# Patient Record
Sex: MALE | Race: WHITE | NOT HISPANIC OR LATINO | URBAN - METROPOLITAN AREA
[De-identification: names, ages, dates, MRNs, and addresses within clinical notes are randomized per-mention and may not be internally consistent; named-entity substitution may affect disease eponyms.]

---

## 2021-02-14 ENCOUNTER — HOSPITAL ENCOUNTER (EMERGENCY)
Facility: MEDICAL CENTER | Age: 36
End: 2021-02-14
Attending: EMERGENCY MEDICINE

## 2021-02-14 VITALS
BODY MASS INDEX: 30.87 KG/M2 | TEMPERATURE: 98.3 F | DIASTOLIC BLOOD PRESSURE: 80 MMHG | WEIGHT: 203.71 LBS | SYSTOLIC BLOOD PRESSURE: 116 MMHG | RESPIRATION RATE: 16 BRPM | OXYGEN SATURATION: 98 % | HEART RATE: 83 BPM | HEIGHT: 68 IN

## 2021-02-14 DIAGNOSIS — K29.21 ACUTE ALCOHOLIC GASTRITIS WITH HEMORRHAGE: ICD-10-CM

## 2021-02-14 DIAGNOSIS — R10.10 UPPER ABDOMINAL PAIN: ICD-10-CM

## 2021-02-14 LAB
ALBUMIN SERPL BCP-MCNC: 4.1 G/DL (ref 3.2–4.9)
ALBUMIN/GLOB SERPL: 1.2 G/DL
ALP SERPL-CCNC: 38 U/L (ref 30–99)
ALT SERPL-CCNC: 24 U/L (ref 2–50)
ANION GAP SERPL CALC-SCNC: 15 MMOL/L (ref 7–16)
AST SERPL-CCNC: 25 U/L (ref 12–45)
BASOPHILS # BLD AUTO: 0.5 % (ref 0–1.8)
BASOPHILS # BLD: 0.04 K/UL (ref 0–0.12)
BILIRUB SERPL-MCNC: 0.2 MG/DL (ref 0.1–1.5)
BUN SERPL-MCNC: 6 MG/DL (ref 8–22)
CALCIUM SERPL-MCNC: 9.3 MG/DL (ref 8.5–10.5)
CHLORIDE SERPL-SCNC: 94 MMOL/L (ref 96–112)
CO2 SERPL-SCNC: 22 MMOL/L (ref 20–33)
CREAT SERPL-MCNC: 0.71 MG/DL (ref 0.5–1.4)
EOSINOPHIL # BLD AUTO: 0.13 K/UL (ref 0–0.51)
EOSINOPHIL NFR BLD: 1.5 % (ref 0–6.9)
ERYTHROCYTE [DISTWIDTH] IN BLOOD BY AUTOMATED COUNT: 46.3 FL (ref 35.9–50)
GLOBULIN SER CALC-MCNC: 3.3 G/DL (ref 1.9–3.5)
GLUCOSE SERPL-MCNC: 100 MG/DL (ref 65–99)
HCT VFR BLD AUTO: 43 % (ref 42–52)
HGB BLD-MCNC: 14.3 G/DL (ref 14–18)
IMM GRANULOCYTES # BLD AUTO: 0.09 K/UL (ref 0–0.11)
IMM GRANULOCYTES NFR BLD AUTO: 1.1 % (ref 0–0.9)
LIPASE SERPL-CCNC: 28 U/L (ref 11–82)
LYMPHOCYTES # BLD AUTO: 3.52 K/UL (ref 1–4.8)
LYMPHOCYTES NFR BLD: 41.4 % (ref 22–41)
MCH RBC QN AUTO: 32.6 PG (ref 27–33)
MCHC RBC AUTO-ENTMCNC: 33.3 G/DL (ref 33.7–35.3)
MCV RBC AUTO: 97.9 FL (ref 81.4–97.8)
MONOCYTES # BLD AUTO: 1.11 K/UL (ref 0–0.85)
MONOCYTES NFR BLD AUTO: 13.1 % (ref 0–13.4)
NEUTROPHILS # BLD AUTO: 3.61 K/UL (ref 1.82–7.42)
NEUTROPHILS NFR BLD: 42.4 % (ref 44–72)
NRBC # BLD AUTO: 0 K/UL
NRBC BLD-RTO: 0 /100 WBC
PLATELET # BLD AUTO: 321 K/UL (ref 164–446)
PMV BLD AUTO: 9.1 FL (ref 9–12.9)
POTASSIUM SERPL-SCNC: 3.3 MMOL/L (ref 3.6–5.5)
PROT SERPL-MCNC: 7.4 G/DL (ref 6–8.2)
RBC # BLD AUTO: 4.39 M/UL (ref 4.7–6.1)
SODIUM SERPL-SCNC: 131 MMOL/L (ref 135–145)
WBC # BLD AUTO: 8.5 K/UL (ref 4.8–10.8)

## 2021-02-14 PROCEDURE — 99284 EMERGENCY DEPT VISIT MOD MDM: CPT | Mod: EDC

## 2021-02-14 PROCEDURE — 83690 ASSAY OF LIPASE: CPT

## 2021-02-14 PROCEDURE — A9270 NON-COVERED ITEM OR SERVICE: HCPCS | Performed by: EMERGENCY MEDICINE

## 2021-02-14 PROCEDURE — 700102 HCHG RX REV CODE 250 W/ 637 OVERRIDE(OP): Performed by: EMERGENCY MEDICINE

## 2021-02-14 PROCEDURE — 85025 COMPLETE CBC W/AUTO DIFF WBC: CPT

## 2021-02-14 PROCEDURE — 36415 COLL VENOUS BLD VENIPUNCTURE: CPT | Mod: EDC

## 2021-02-14 PROCEDURE — 700111 HCHG RX REV CODE 636 W/ 250 OVERRIDE (IP): Performed by: EMERGENCY MEDICINE

## 2021-02-14 PROCEDURE — 80053 COMPREHEN METABOLIC PANEL: CPT

## 2021-02-14 RX ORDER — OMEPRAZOLE 40 MG/1
40 CAPSULE, DELAYED RELEASE ORAL DAILY
Qty: 30 CAPSULE | Refills: 0 | Status: SHIPPED | OUTPATIENT
Start: 2021-02-14

## 2021-02-14 RX ORDER — SUCRALFATE 1 G/1
1 TABLET ORAL
Qty: 120 TABLET | Refills: 3 | Status: SHIPPED | OUTPATIENT
Start: 2021-02-14

## 2021-02-14 RX ORDER — ONDANSETRON 4 MG/1
4 TABLET, ORALLY DISINTEGRATING ORAL ONCE
Status: COMPLETED | OUTPATIENT
Start: 2021-02-14 | End: 2021-02-14

## 2021-02-14 RX ADMIN — LIDOCAINE HYDROCHLORIDE 30 ML: 20 SOLUTION OROPHARYNGEAL at 17:43

## 2021-02-14 RX ADMIN — ONDANSETRON 4 MG: 4 TABLET, ORALLY DISINTEGRATING ORAL at 17:43

## 2021-02-14 ASSESSMENT — LIFESTYLE VARIABLES
TOTAL SCORE: 3
HOW MANY TIMES IN THE PAST YEAR HAVE YOU HAD 5 OR MORE DRINKS IN A DAY: 365
ON A TYPICAL DAY WHEN YOU DRINK ALCOHOL HOW MANY DRINKS DO YOU HAVE: 12
EVER HAD A DRINK FIRST THING IN THE MORNING TO STEADY YOUR NERVES TO GET RID OF A HANGOVER: YES
HAVE YOU EVER FELT YOU SHOULD CUT DOWN ON YOUR DRINKING: YES
DOES PATIENT WANT TO STOP DRINKING: NO
HAVE PEOPLE ANNOYED YOU BY CRITICIZING YOUR DRINKING: NO
DO YOU DRINK ALCOHOL: YES
AVERAGE NUMBER OF DAYS PER WEEK YOU HAVE A DRINK CONTAINING ALCOHOL: 7
CONSUMPTION TOTAL: POSITIVE
EVER FELT BAD OR GUILTY ABOUT YOUR DRINKING: YES

## 2021-02-15 NOTE — ED TRIAGE NOTES
Oliverio Moralez  36 y.o.  Chief Complaint   Patient presents with   • Epigastric Pain     since yesterday   • N/V     reports vomiting mucus, bile, and trace blood today; denies diarrhea or urinary sx

## 2021-02-15 NOTE — ED NOTES
Pt reports he is a heavy drinker. Reports all symptoms started today. Pt reports blood work has been sent to lab.

## 2021-02-15 NOTE — ED NOTES
"Oliverio Moralez D/C'd.  Discharge instructions including s/s to return to ED, follow up appointments, hydration importance and stopping alcohol consumption education  provided to pt.    Pt verbalized understanding with no further questions and concerns.    Copy of discharge provided to pt.  Signed copy in chart.    Pt ambulates out of department; pt awake, alert and oriented.   VS /80   Pulse 83   Temp 36.8 °C (98.3 °F) (Temporal)   Resp 16   Ht 1.727 m (5' 8\")   Wt 92.4 kg (203 lb 11.3 oz)   SpO2 98%   BMI 30.97 kg/m²       "

## 2021-02-15 NOTE — ED PROVIDER NOTES
"ED Provider Note    CHIEF COMPLAINT  Chief Complaint   Patient presents with    Epigastric Pain     since yesterday    N/V     reports vomiting mucus, bile, and trace blood today; denies diarrhea or urinary sx       HPI  Oliverio Moralez is a 36 y.o. male who presents 2 days of upper abdominal pain with nausea and vomiting and some blood in his emesis, states that he is a heavy alcohol drinker although has never had these symptoms before.  He tried some Tums with minimal improvement.  The patient states that approximately 15% of his emesis is actual blood, the rest of his bile or food product.  No abdominal surgeries.  No back pain or chest pain or shortness of breath.  He is a smoker.  No other specific complaints.    REVIEW OF SYSTEMS  Negative for fever, rash, chest pain, dyspnea, headache, focal weakness, focal numbness, focal tingling, back pain. All other systems are negative.     PAST MEDICAL HISTORY  History reviewed. No pertinent past medical history.    FAMILY HISTORY  History reviewed. No pertinent family history.    SOCIAL HISTORY  Social History     Tobacco Use    Smoking status: Current Every Day Smoker    Smokeless tobacco: Never Used   Substance Use Topics    Alcohol use: Yes     Comment: occasional    Drug use: Yes     Types: Inhaled     Comment: marijuana       SURGICAL HISTORY  Past Surgical History:   Procedure Laterality Date    HERNIA REPAIR         CURRENT MEDICATIONS  I personally reviewed the medication list in the charting documentation.     ALLERGIES  No Known Allergies    MEDICAL RECORD  I have reviewed patient's medical record and pertinent results are listed above.      PHYSICAL EXAM  VITAL SIGNS: /85   Pulse 83   Temp 36.7 °C (98.1 °F) (Temporal)   Resp 16   Ht 1.727 m (5' 8\")   Wt 92.4 kg (203 lb 11.3 oz)   SpO2 98%   BMI 30.97 kg/m²    Constitutional: Well appearing patient in no acute distress.  Not toxic, nor ill in appearance.  HENT: Normocephalic, no evidence " of acute trauma.  Eyes: No scleral icterus. Normal conjunctiva   Neck: Supple, comfortable, nonpainful range of motion.   Cardiovascular: Regular heart rate and rhythm.   Thorax & Lungs: Chest is nontender.  Lungs are clear to auscultation with good air movement bilaterally.  No wheeze, rhonchi, nor rales.   Abdomen: Soft, nondistended, mild tenderness across the upper abdomen.  No rebound or guarding.   Skin: Warm, dry. No rash appreciated  Neurologic: Alert & oriented. No focal deficits observed.   Psychiatric: Normal affect appropriate for the clinical situation.    DIAGNOSTIC STUDIES / PROCEDURES    Results for orders placed or performed during the hospital encounter of 02/14/21   CBC WITH DIFFERENTIAL   Result Value Ref Range    WBC 8.5 4.8 - 10.8 K/uL    RBC 4.39 (L) 4.70 - 6.10 M/uL    Hemoglobin 14.3 14.0 - 18.0 g/dL    Hematocrit 43.0 42.0 - 52.0 %    MCV 97.9 (H) 81.4 - 97.8 fL    MCH 32.6 27.0 - 33.0 pg    MCHC 33.3 (L) 33.7 - 35.3 g/dL    RDW 46.3 35.9 - 50.0 fL    Platelet Count 321 164 - 446 K/uL    MPV 9.1 9.0 - 12.9 fL    Neutrophils-Polys 42.40 (L) 44.00 - 72.00 %    Lymphocytes 41.40 (H) 22.00 - 41.00 %    Monocytes 13.10 0.00 - 13.40 %    Eosinophils 1.50 0.00 - 6.90 %    Basophils 0.50 0.00 - 1.80 %    Immature Granulocytes 1.10 (H) 0.00 - 0.90 %    Nucleated RBC 0.00 /100 WBC    Neutrophils (Absolute) 3.61 1.82 - 7.42 K/uL    Lymphs (Absolute) 3.52 1.00 - 4.80 K/uL    Monos (Absolute) 1.11 (H) 0.00 - 0.85 K/uL    Eos (Absolute) 0.13 0.00 - 0.51 K/uL    Baso (Absolute) 0.04 0.00 - 0.12 K/uL    Immature Granulocytes (abs) 0.09 0.00 - 0.11 K/uL    NRBC (Absolute) 0.00 K/uL   COMP METABOLIC PANEL   Result Value Ref Range    Sodium 131 (L) 135 - 145 mmol/L    Potassium 3.3 (L) 3.6 - 5.5 mmol/L    Chloride 94 (L) 96 - 112 mmol/L    Co2 22 20 - 33 mmol/L    Anion Gap 15.0 7.0 - 16.0    Glucose 100 (H) 65 - 99 mg/dL    Bun 6 (L) 8 - 22 mg/dL    Creatinine 0.71 0.50 - 1.40 mg/dL    Calcium 9.3 8.5 - 10.5  mg/dL    AST(SGOT) 25 12 - 45 U/L    ALT(SGPT) 24 2 - 50 U/L    Alkaline Phosphatase 38 30 - 99 U/L    Total Bilirubin 0.2 0.1 - 1.5 mg/dL    Albumin 4.1 3.2 - 4.9 g/dL    Total Protein 7.4 6.0 - 8.2 g/dL    Globulin 3.3 1.9 - 3.5 g/dL    A-G Ratio 1.2 g/dL   LIPASE   Result Value Ref Range    Lipase 28 11 - 82 U/L   ESTIMATED GFR   Result Value Ref Range    GFR If African American >60 >60 mL/min/1.73 m 2    GFR If Non African American >60 >60 mL/min/1.73 m 2        COURSE & MEDICAL DECISION MAKING  I have reviewed any medical record information, laboratory studies and radiographic results as noted above.  Differential diagnoses includes: Alcoholic gastritis/PUD, pancreatitis, hepatitis, biliary obstruction, anemia, dehydration, electrolyte abnormalities    Encounter Summary: This is a 36 y.o. male with 2 days of vomiting and epigastric abdominal pain in the setting of heavy alcohol use, he has had some hematemesis but states this seems to make up the minority of his vomitus.  He has tenderness across the upper abdomen on exam but no evidence of peritonitis.  His vital signs are within normal limits, no indication of tachycardia or hypotension.  Will obtain blood work including a lipase, medicate with a GI cocktail and he will be reevaluated ------- blood work is reassuring.  At this point, I think gastritis/PUD is the most likely etiology, will prescribe him Prilosec and Carafate although of most importance I have educated him on the importance of alcohol cessation and relieving his symptoms, he states that he typically drinks around 12 alcoholic beverages a day and has been doing so for quite some time he has been referred to GI.  Strict return instructions have been discussed.      DISPOSITION: Discharged home in stable condition      FINAL IMPRESSION  1. Acute alcoholic gastritis with hemorrhage    2. Upper abdominal pain           This dictation was created using voice recognition software. The accuracy of the  dictation is limited to the abilities of the software. I expect there may be some errors of grammar and possibly content. The nursing notes were reviewed and certain aspects of this information were incorporated into this note.    Electronically signed by: Mj Biggs M.D., 2/14/2021 5:37 PM

## 2025-04-05 ENCOUNTER — OFFICE VISIT (OUTPATIENT)
Dept: URGENT CARE | Facility: CLINIC | Age: 40
End: 2025-04-05
Payer: COMMERCIAL

## 2025-04-05 VITALS
HEART RATE: 82 BPM | OXYGEN SATURATION: 99 % | BODY MASS INDEX: 32.01 KG/M2 | HEIGHT: 68 IN | TEMPERATURE: 97.7 F | WEIGHT: 211.2 LBS | DIASTOLIC BLOOD PRESSURE: 56 MMHG | RESPIRATION RATE: 14 BRPM | SYSTOLIC BLOOD PRESSURE: 104 MMHG

## 2025-04-05 DIAGNOSIS — M54.50 NONSPECIFIC LOW BACK PAIN: ICD-10-CM

## 2025-04-05 DIAGNOSIS — Z75.8 DOES NOT HAVE PRIMARY CARE PROVIDER: ICD-10-CM

## 2025-04-05 PROCEDURE — 3074F SYST BP LT 130 MM HG: CPT

## 2025-04-05 PROCEDURE — 3078F DIAST BP <80 MM HG: CPT

## 2025-04-05 PROCEDURE — 99213 OFFICE O/P EST LOW 20 MIN: CPT

## 2025-04-05 RX ORDER — PREDNISONE 20 MG/1
20 TABLET ORAL DAILY
Qty: 3 TABLET | Refills: 0 | Status: SHIPPED | OUTPATIENT
Start: 2025-04-05 | End: 2025-04-08

## 2025-04-05 RX ORDER — CYCLOBENZAPRINE HCL 10 MG
TABLET ORAL
Qty: 21 TABLET | Refills: 0 | Status: SHIPPED | OUTPATIENT
Start: 2025-04-05

## 2025-04-05 NOTE — PROGRESS NOTES
"Subjective     Edelizabeth Moralez is a 40 y.o. male who presents with Low Back Pain (X couple weeks , getting worse )            HPI  This is a chronic problem, reports this has been an ongoing problem for the past 20 years. Recent flare began 2 weeks ago. Localized. Non-radiating.  Worse with movement including bending forward, twisting, sitting, standing and laying down for too long. Pt lifts heavy boxes at work. Denies blood in urine, dysuria, urinary frequncy and urgency.     No fevers, chills, trauma, recent surgery, new onset numbness, tingling, or weakness in lower extremities, groin or saddle paresthesia. Denies urinary, fecal incontinence or retention, IV drug use, history of cancer/malignancy, unexplained weight loss.     Review of Systems   All other systems reviewed and are negative.           Objective     /56 (BP Location: Left arm, Patient Position: Sitting, BP Cuff Size: Adult)   Pulse 82   Temp 36.5 °C (97.7 °F)   Resp 14   Ht 1.727 m (5' 8\")   Wt 95.8 kg (211 lb 3.2 oz)   SpO2 99%   BMI 32.11 kg/m²      Physical Exam  Vitals reviewed.   Constitutional:       Appearance: Normal appearance. He is not ill-appearing or toxic-appearing.   HENT:      Head: Normocephalic and atraumatic.      Right Ear: External ear normal.      Left Ear: External ear normal.   Cardiovascular:      Rate and Rhythm: Normal rate.   Pulmonary:      Effort: Pulmonary effort is normal.   Musculoskeletal:      Comments: No asymmetry or gross deformities appreciated. Skin w/o rash, erythema, ecchymosis. FROM to flexion, extension, lateral bend, lateral twist but with pain. No midline or bony tenderness. No CVA tenderness. + Paraspinal musculature tenderness. 5/5 strength to hip flexion/extension, knee flexion/extension, dorsiflexion, plantar flexion. Straight leg raise negative. Neuro: SILT, 2+ DTR bilateral LE.    Skin:     General: Skin is warm and dry.      Capillary Refill: Capillary refill takes less than 2 " seconds.   Neurological:      General: No focal deficit present.      Mental Status: He is alert.   Psychiatric:         Behavior: Behavior normal.                                  Assessment & Plan  Nonspecific low back pain    Orders:    cyclobenzaprine (FLEXERIL) 10 mg Tab; 1 tab by mouth every 8 hours only if needed for pain, muscle spasm, and/or muscle tightness. May cause drowsiness.    predniSONE (DELTASONE) 20 MG Tab; Take 1 Tablet by mouth every day for 3 days.    Does not have primary care provider    Orders:    Referral to establish with PCP    Responded well to steroids in the past, per patient.  Recommend rest but no bed rest, back strengthening exercises and stretching, alternating with warm/ice packs.     Discussed management options (risks, benefits, and alternatives to treatment). Reasonable side affects and potential adverse effects of medication discussed. Pt expresses understanding and the treatment plan was agreed upon.     Differential diagnosis, natural history, supportive care, and indications for immediate follow-up discussed. Advised the patient to follow-up with PCP or RTC for recheck, reevaluation, and consideration of further management. Instructed to go to the nearest Emergency Department or call 911 if symptoms fail to improve, for any change in condition, further concerns, or new concerning symptoms.     Electronically signed by   Chaparro Booker DNP, JM, FABIOLA

## 2025-05-14 ENCOUNTER — OFFICE VISIT (OUTPATIENT)
Dept: MEDICAL GROUP | Facility: LAB | Age: 40
End: 2025-05-14
Payer: COMMERCIAL

## 2025-05-14 VITALS
WEIGHT: 214.73 LBS | OXYGEN SATURATION: 98 % | RESPIRATION RATE: 16 BRPM | TEMPERATURE: 98.7 F | BODY MASS INDEX: 32.54 KG/M2 | HEIGHT: 68 IN | SYSTOLIC BLOOD PRESSURE: 132 MMHG | DIASTOLIC BLOOD PRESSURE: 84 MMHG | HEART RATE: 84 BPM

## 2025-05-14 DIAGNOSIS — Z23 NEED FOR VACCINATION: ICD-10-CM

## 2025-05-14 DIAGNOSIS — M54.50 LUMBAR PAIN: ICD-10-CM

## 2025-05-14 DIAGNOSIS — Z00.00 ENCOUNTER FOR ADULT WELLNESS VISIT: ICD-10-CM

## 2025-05-14 DIAGNOSIS — F17.210 CIGARETTE NICOTINE DEPENDENCE WITHOUT COMPLICATION: ICD-10-CM

## 2025-05-14 PROCEDURE — 90471 IMMUNIZATION ADMIN: CPT | Mod: 25 | Performed by: PHYSICIAN ASSISTANT

## 2025-05-14 PROCEDURE — 90715 TDAP VACCINE 7 YRS/> IM: CPT | Performed by: PHYSICIAN ASSISTANT

## 2025-05-14 PROCEDURE — 3075F SYST BP GE 130 - 139MM HG: CPT | Performed by: PHYSICIAN ASSISTANT

## 2025-05-14 PROCEDURE — 90746 HEPB VACCINE 3 DOSE ADULT IM: CPT | Mod: JZ | Performed by: PHYSICIAN ASSISTANT

## 2025-05-14 PROCEDURE — 99406 BEHAV CHNG SMOKING 3-10 MIN: CPT | Performed by: PHYSICIAN ASSISTANT

## 2025-05-14 PROCEDURE — 99214 OFFICE O/P EST MOD 30 MIN: CPT | Mod: 25 | Performed by: PHYSICIAN ASSISTANT

## 2025-05-14 PROCEDURE — 3079F DIAST BP 80-89 MM HG: CPT | Performed by: PHYSICIAN ASSISTANT

## 2025-05-14 PROCEDURE — 90472 IMMUNIZATION ADMIN EACH ADD: CPT | Mod: 25 | Performed by: PHYSICIAN ASSISTANT

## 2025-05-14 ASSESSMENT — PATIENT HEALTH QUESTIONNAIRE - PHQ9: CLINICAL INTERPRETATION OF PHQ2 SCORE: 0

## 2025-05-14 NOTE — ASSESSMENT & PLAN NOTE
Chronic,stable   Recent flareup of pain a month ago   Recommend moving forward with a lumbar x-ray for further evaluation as well as physical therapy pain relief.  In the interim I will supplement him with topical Voltaren gel for pain relief.    Orders:    DX-LUMBAR SPINE-2 OR 3 VIEWS; Future    Referral to Physical Therapy    diclofenac sodium (VOLTAREN) 1 % Gel; Apply 2 g topically 4 times a day as needed (Apply topically for pain reief).

## 2025-05-14 NOTE — PROGRESS NOTES
Subjective  Oliverio Moralez is a 40 y.o. male who presents today to establish care.     Chronic lumbar pain  Onset: Several years ago, gradually worsening  Patient was in the urgent care a month ago, after he got out of bed and could barely walk due to pain.  This is what motivated him to establish with a primary care provider.  He describes his symptoms as a dull and achy sensation across the low back, with radicular symptoms down the left lower extremity, and into the left heel.   Alleviating factors: Rest and Flexeril  Aggravating factors: Heavy lifting and being on his feet for long periods of time.   Denied loss of bowel or bladder control, denies saddle anesthesia.    Health maintenance  Dental care: Patient does not see dentist yearly, and has not seen one in several years.  He is currently not interested in establishing with a dentist  Diet and exercise: Patient eats an overall balanced diet, reports that his fiancée enjoys cooking at home, and they will often have ground turkey and vegetables.  It is rare when he will indulge in red meat, such as steak.  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      No family history on file.    Social History     Socioeconomic History    Marital status: Single     Spouse name: Not on file    Number of children: Not on file    Years of education: Not on file    Highest education level: Not on file   Occupational History    Not on file   Tobacco Use    Smoking status: Every Day     Current packs/day: 1.00     Types: Cigarettes    Smokeless tobacco: Never   Vaping Use    Vaping status: Never Used   Substance and Sexual Activity    Alcohol use: Yes     Comment: 6 pac of beer a day    Drug use: Yes     Types: Marijuana     Comment: marijuana    Sexual activity: Not on file   Other Topics Concern    Not on file   Social History Narrative    Not on file     Social Drivers of Health     Financial Resource Strain: Not on file   Food  "Insecurity: Not on file   Transportation Needs: Not on file   Physical Activity: Not on file   Stress: Not on file   Social Connections: Not on file   Intimate Partner Violence: Not on file   Housing Stability: Not on file       Past Surgical History[1]    Past Medical History[2]    Current Medications[3]    Allergies[4]    Objective  /84 (BP Location: Left arm, Patient Position: Sitting, BP Cuff Size: Adult)   Pulse 84   Temp 37.1 °C (98.7 °F) (Temporal)   Resp 16   Ht 1.727 m (5' 8\")   Wt 97.4 kg (214 lb 11.7 oz)   SpO2 98%   Physical Exam  Constitutional:       Appearance: Normal appearance.   HENT:      Right Ear: Tympanic membrane, ear canal and external ear normal. There is no impacted cerumen.      Left Ear: Tympanic membrane, ear canal and external ear normal. There is no impacted cerumen.      Mouth/Throat:      Mouth: Mucous membranes are moist.      Pharynx: Oropharynx is clear. No oropharyngeal exudate or posterior oropharyngeal erythema.   Eyes:      Extraocular Movements: Extraocular movements intact.      Pupils: Pupils are equal, round, and reactive to light.   Cardiovascular:      Rate and Rhythm: Normal rate and regular rhythm.   Pulmonary:      Effort: Pulmonary effort is normal.      Breath sounds: Normal breath sounds.   Musculoskeletal:      Right lower leg: No edema.      Left lower leg: No edema.      Comments: TTP over the lumbar facets, bilaterally   Skin:     General: Skin is warm and dry.   Neurological:      Mental Status: He is alert.   Psychiatric:         Mood and Affect: Mood normal.         Behavior: Behavior normal.         Thought Content: Thought content normal.         Labs: None    Imaging: None    Assessment & Plan  Encounter for adult wellness visit  A lipid panel, thyroid function tests, A1c, CBC, and CMP will be ordered. He was advised to fast for 8 to 10 hours prior to the blood draw.      Orders:    HEMOGLOBIN A1C; Future    CBC WITHOUT DIFFERENTIAL; Future    " Comp Metabolic Panel; Future    Lipid Profile; Future    TSH WITH REFLEX TO FT4; Future    Lumbar pain  Chronic,stable   Recent flareup of pain a month ago   Recommend moving forward with a lumbar x-ray for further evaluation as well as physical therapy pain relief.  In the interim I will supplement him with topical Voltaren gel for pain relief.    Orders:    DX-LUMBAR SPINE-2 OR 3 VIEWS; Future    Referral to Physical Therapy    diclofenac sodium (VOLTAREN) 1 % Gel; Apply 2 g topically 4 times a day as needed (Apply topically for pain reief).    Cigarette nicotine dependence without complication  Smoking cessation counseling: 10 minutes including discussion of various products available to assist with this endeavor including nicotine replacement patch, gum, Chantix, Wellbutrin, support groups and therapy. Emphasized the importance of identifying and eliminating triggers. Medical and social consequences of continued tobacco use discussed.    Patient refused services, says he's not planning on quitting, but states he will make an appointment if he wishes to discuss this again in the future.               1. Encounter for adult wellness visit  - HEMOGLOBIN A1C; Future  - CBC WITHOUT DIFFERENTIAL; Future  - Comp Metabolic Panel; Future  - Lipid Profile; Future  - TSH WITH REFLEX TO FT4; Future    2. Lumbar pain  - DX-LUMBAR SPINE-2 OR 3 VIEWS; Future  - Referral to Physical Therapy  - diclofenac sodium (VOLTAREN) 1 % Gel; Apply 2 g topically 4 times a day as needed (Apply topically for pain reief).  Dispense: 50 g; Refill: 3    3. Cigarette nicotine dependence without complication    4. Need for vaccination  - Tdap Vaccine =>8YO IM  - Hepatitis B Vaccine Adult 20+         [1]   Past Surgical History:  Procedure Laterality Date    HERNIA REPAIR     [2] No past medical history on file.  [3]   Current Outpatient Medications   Medication Sig Dispense Refill    diclofenac sodium (VOLTAREN) 1 % Gel Apply 2 g topically 4  times a day as needed (Apply topically for pain reief). 50 g 3    cyclobenzaprine (FLEXERIL) 10 mg Tab 1 tab by mouth every 8 hours only if needed for pain, muscle spasm, and/or muscle tightness. May cause drowsiness. 21 Tablet 0     No current facility-administered medications for this visit.   [4] No Known Allergies

## 2025-05-14 NOTE — ASSESSMENT & PLAN NOTE
A lipid panel, thyroid function tests, A1c, CBC, and CMP will be ordered. He was advised to fast for 8 to 10 hours prior to the blood draw.      Orders:    HEMOGLOBIN A1C; Future    CBC WITHOUT DIFFERENTIAL; Future    Comp Metabolic Panel; Future    Lipid Profile; Future    TSH WITH REFLEX TO FT4; Future

## 2025-05-14 NOTE — ASSESSMENT & PLAN NOTE
Smoking cessation counseling: 10 minutes including discussion of various products available to assist with this endeavor including nicotine replacement patch, gum, Chantix, Wellbutrin, support groups and therapy. Emphasized the importance of identifying and eliminating triggers. Medical and social consequences of continued tobacco use discussed.    Patient refused services, says he's not planning on quitting, but states he will make an appointment if he wishes to discuss this again in the future.

## 2025-06-11 ENCOUNTER — HOSPITAL ENCOUNTER (OUTPATIENT)
Dept: RADIOLOGY | Facility: MEDICAL CENTER | Age: 40
End: 2025-06-11
Attending: PHYSICIAN ASSISTANT
Payer: COMMERCIAL

## 2025-06-11 ENCOUNTER — RESULTS FOLLOW-UP (OUTPATIENT)
Dept: MEDICAL GROUP | Facility: LAB | Age: 40
End: 2025-06-11

## 2025-06-11 DIAGNOSIS — M54.50 LUMBAR PAIN: ICD-10-CM

## 2025-06-11 PROCEDURE — 72100 X-RAY EXAM L-S SPINE 2/3 VWS: CPT
